# Patient Record
Sex: MALE | Race: WHITE | NOT HISPANIC OR LATINO | Employment: FULL TIME | URBAN - METROPOLITAN AREA
[De-identification: names, ages, dates, MRNs, and addresses within clinical notes are randomized per-mention and may not be internally consistent; named-entity substitution may affect disease eponyms.]

---

## 2019-08-29 ENCOUNTER — EVALUATION (OUTPATIENT)
Dept: PHYSICAL THERAPY | Age: 29
End: 2019-08-29
Payer: COMMERCIAL

## 2019-08-29 DIAGNOSIS — H81.13 BENIGN PAROXYSMAL VERTIGO OF BOTH EARS: Primary | ICD-10-CM

## 2019-08-29 DIAGNOSIS — R42 DIZZINESS: ICD-10-CM

## 2019-08-29 PROCEDURE — 97161 PT EVAL LOW COMPLEX 20 MIN: CPT | Performed by: PHYSICAL THERAPIST

## 2019-08-29 PROCEDURE — 95992 CANALITH REPOSITIONING PROC: CPT | Performed by: PHYSICAL THERAPIST

## 2019-08-29 PROCEDURE — 97110 THERAPEUTIC EXERCISES: CPT | Performed by: PHYSICAL THERAPIST

## 2019-08-29 NOTE — PROGRESS NOTES
PT Evaluation     Today's date: 2019  Patient name: Carolynn Davis  : 1990  MRN: [id number]  Referring provider: Anabella Guillen DO  Dx:   Encounter Diagnosis     ICD-10-CM    1  Benign paroxysmal vertigo of both ears H81 13    2  Dizziness R42        Start Time: 830  Stop Time: 930  Total time in clinic (min): 60 minutes    Assessment  Assessment details: Carolynn Davis is a 34 y o  male who presents with dizziness  Due to these impairments, Patient has difficulty performing recreational activities, work-related activities and engaging in social activities  Patient's clinical presentation is consistent with their referring diagnosis of dizziness  Patient was negative with the dik hallpike to both sides, positive with anterior maneuver, overall head positions  Patient given HEP  Patient going on vacation Saturday for a week  Plan to follow up with patient upon his return  Patient would benefit from skilled physical therapy to address their aforementioned impairments, improve their level of function and to improve their overall quality of life  Impairments: activity intolerance, lacks appropriate home exercise program and poor posture   Other impairment: dizziness  Barriers to therapy: History of allergies  Understanding of Dx/Px/POC: excellent  Goals    Goals  STG 1-2 weeks  1  Decrease c/o dizziness/vertigo by 90%  2  transfers with ease from supine-sit- stand without symptoms of dizziness  LTG 2-4 weeks  1  Resolve symptoms of vertigo  2  Independent HEP for home Epley maneuver/self management  3  DHI<10%  4  Pt able to move in/out of bed and roll over without onset of symptoms  5  Able to return to prior level of function without medication for dizziness  Goals  LT-6 WEEKS  1  Patient to be independent with a/iadls  2  Increase functional activities for leisure and home activities to previous LOF    3  Independent with HEP and/or fitness program     Plan  Patient would benefit from: skilled physical therapy  Planned therapy interventions: body mechanics training, home exercise program, neuromuscular re-education, patient education, postural training, stretching, therapeutic activities, therapeutic exercise, canalith repositioning and activity modification  Frequency: 2-3x week  Duration in weeks: 8  Plan of Care beginning date: 8/29/2019  Plan of Care expiration date: 10/29/2019  Treatment plan discussed with: patient        Subjective Evaluation    History of Present Illness  Mechanism of injury: Patient reports he had a couple ear infections over the past month and was on drops and prednisone  Patient was seen at ER, ENT, ordered MRI of brain and normal  Patient still has feeling of dizziness at times, especially when walking  Patient was able to return to work  Not a recurrent problem   Pain  No pain reported    Social Support  Lives with: grandma  Employment status: working    Diagnostic Tests  MRI studies: normal    FCE comments: Brain MRI: negative/ptPatient Goals  Patient goal: not be dizzy  Objective     Concurrent Complaints  Negative for headaches, nausea/motion sickness, tinnitus, visual change, hearing loss, memory loss, aural fullness, poor concentration and peripheral neuropathy    Static Posture     Head  Forward  Shoulders  Rounded      Postural Observations  Seated posture: poor  Standing posture: fair  Correction of posture: has no consistent effect        Neurological Testing     Sensation   Cervical/Thoracic   Left   Intact: light touch    Right   Intact: light touch    Active Range of Motion   Cervical/Thoracic Spine     Normal active range of motion    Passive Range of Motion   Cervical/Thoracic Spine   Normal passive range of motion    Strength/Myotome Testing   Cervical Spine     Left   Normal strength    Right   Normal strength    General Comments:    Upper quarter screen   Shoulder: unremarkable  Neuro Exam:     Dizziness  Positive for disequilibrium and floating or swimming  Exacerbating factors  Positive for rolling in bed, looking up, walking, turning head, supine to/from sitting and walking in busy environment  Bending over: sometimes       Symptoms   Duration: <5sec  Frequency: daily    Headaches   Patient reports headaches: No      Oculomotor exam   Oculomotor ROM: WNL  Resting nystagmus: not present   Gaze holding nystagmus: not present left  and not present right  Smooth pursuits: within normal limits  Vertical saccades: normal  Horizontal saccades: normal    Positional testing   [place]pike   Left posterior canal: WNL  Right posterior canal: WNL    Functional outcomes   Functional outcome comment: DHI: 24/100      Flowsheet Rows      Most Recent Value   PT/OT G-Codes   Current Score  73   Projected Score  95             Precautions: standard      Manual  8/29            Dik Hallpike L 1x            Dik Hallpike R 1x            Anterior maneurver 1x (3min)                                          Exercise Diary  8/29            HEP 10'

## 2019-09-17 NOTE — PROGRESS NOTES
Patient called to state he is feeling good, able to work and has no symptoms anymore  D/c PT at this time

## 2020-01-21 RX ORDER — SUCRALFATE 1 G/1
1 TABLET ORAL 4 TIMES DAILY
COMMUNITY
Start: 2020-01-20 | End: 2020-02-17

## 2020-01-22 ENCOUNTER — OFFICE VISIT (OUTPATIENT)
Dept: GASTROENTEROLOGY | Facility: CLINIC | Age: 30
End: 2020-01-22
Payer: COMMERCIAL

## 2020-01-22 ENCOUNTER — PREP FOR PROCEDURE (OUTPATIENT)
Dept: GASTROENTEROLOGY | Facility: CLINIC | Age: 30
End: 2020-01-22

## 2020-01-22 VITALS
SYSTOLIC BLOOD PRESSURE: 126 MMHG | BODY MASS INDEX: 35.53 KG/M2 | HEART RATE: 99 BPM | WEIGHT: 253.8 LBS | HEIGHT: 71 IN | DIASTOLIC BLOOD PRESSURE: 90 MMHG

## 2020-01-22 DIAGNOSIS — R10.13 EPIGASTRIC PAIN: Primary | ICD-10-CM

## 2020-01-22 PROCEDURE — 99203 OFFICE O/P NEW LOW 30 MIN: CPT | Performed by: PHYSICIAN ASSISTANT

## 2020-01-22 RX ORDER — ESOMEPRAZOLE MAGNESIUM 40 MG/1
CAPSULE, DELAYED RELEASE ORAL
COMMUNITY
Start: 2020-01-06

## 2020-01-22 NOTE — PROGRESS NOTES
[facility] - Outpatient Consultation  Meryle Catalina 34 y o  male MRN: [id number]  Encounter: [id number]          ASSESSMENT AND PLAN:      1  Epigastric pain  6 weeks  Not improving with Prilosec or Nexium  Pantoprazole caused a headache    Will plan US and EGD    ______________________________________________________________________    HPI:  34year old male presents for evaluation of epigastric pain  He reports this been ongoing for approximately 5-6 weeks  He describes it as a pressure sensation  It tends to be the worst in the morning and get better as the day goes on  He has had a few episodes of heartburn but otherwise has no other symptoms of acid reflux  He initially started taking over-the-counter Prilosec without improvement  He then saw his family doctor complete him on pantoprazole which caused headache  He was eventually switched to Nexium which she thought was helping for the 1st few weeks but recently has not helped at all  He states that eating occasionally makes his symptoms worse but for the most part is able to eat without issues  There is no nausea, vomiting, trouble swallowing, hematemesis or melena  His weight is stable  Prior to symptom onset he denies any stressful event, new medications, travel  There is no change in the bowel movements  REVIEW OF SYSTEMS:    CONSTITUTIONAL: Denies any fever, chills, rigors, and weight loss  HEENT: No earache or tinnitus  Denies hearing loss or visual disturbances  CARDIOVASCULAR: No chest pain or palpitations  RESPIRATORY: Denies any cough, hemoptysis, shortness of breath or dyspnea on exertion  GASTROINTESTINAL: As noted in the History of Present Illness  GENITOURINARY: No problems with urination  Denies any hematuria or dysuria  NEUROLOGIC: No dizziness or vertigo, denies headaches  MUSCULOSKELETAL: Denies any muscle or joint pain  SKIN: Denies skin rashes or itching     ENDOCRINE: Denies excessive thirst  Denies intolerance to heat or cold  PSYCHOSOCIAL: Denies depression or anxiety  Denies any recent memory loss  Historical Information   Past Medical History:   Diagnosis Date    Allergic rhinitis      Past Surgical History:   Procedure Laterality Date    ADENOIDECTOMY      TONSILLECTOMY       Social History   Social History     Substance and Sexual Activity   Alcohol Use Not Currently    Frequency: Monthly or less     Social History     Substance and Sexual Activity   Drug Use Never     Social History     Tobacco Use   Smoking Status Never Smoker   Smokeless Tobacco Never Used     Family History   Problem Relation Age of Onset    No Known Problems Mother     No Known Problems Father        Meds/Allergies       Current Outpatient Medications:     esomeprazole (NexIUM) 40 MG capsule    sucralfate (CARAFATE) 1 g tablet    fluticasone (FLONASE) 50 mcg/act nasal spray    No Known Allergies        Objective     Blood pressure 126/90, pulse 99, height 5' 11" (1 803 m), weight 115 kg (253 lb 12 8 oz)  Body mass index is 35 4 kg/m²  PHYSICAL EXAM:      General Appearance:   Alert, cooperative, no distress   HEENT:   Normocephalic, atraumatic, anicteric      Neck:  Supple, symmetrical, trachea midline   Lungs:   Clear to auscultation bilaterally; no rales, rhonchi or wheezing; respirations unlabored    Heart[de-identified]   Regular rate and rhythm; no murmur, rub, or gallop  Abdomen:   Soft, non-tender, non-distended; normal bowel sounds; no masses, no organomegaly    Genitalia:   Deferred    Rectal:   Deferred    Extremities:  No cyanosis, clubbing or edema    Pulses:  2+ and symmetric    Skin:  No jaundice, rashes, or lesions    Lymph nodes:  No palpable cervical lymphadenopathy        Lab Results:   No visits with results within 1 Day(s) from this visit  Latest known visit with results is:   No results found for any previous visit  Radiology Results:   No results found

## 2020-01-23 ENCOUNTER — HOSPITAL ENCOUNTER (OUTPATIENT)
Dept: ULTRASOUND IMAGING | Facility: CLINIC | Age: 30
Discharge: HOME | End: 2020-01-23
Payer: COMMERCIAL

## 2020-01-23 ENCOUNTER — ANESTHESIA EVENT (OUTPATIENT)
Dept: GASTROENTEROLOGY | Facility: HOSPITAL | Age: 30
End: 2020-01-23

## 2020-01-23 DIAGNOSIS — R10.13 EPIGASTRIC PAIN: ICD-10-CM

## 2020-01-23 PROCEDURE — 76700 US EXAM ABDOM COMPLETE: CPT

## 2020-01-23 RX ORDER — SODIUM CHLORIDE, SODIUM LACTATE, POTASSIUM CHLORIDE, CALCIUM CHLORIDE 600; 310; 30; 20 MG/100ML; MG/100ML; MG/100ML; MG/100ML
125 INJECTION, SOLUTION INTRAVENOUS CONTINUOUS
Status: CANCELLED | OUTPATIENT
Start: 2020-01-23

## 2020-01-24 ENCOUNTER — HOSPITAL ENCOUNTER (OUTPATIENT)
Dept: GASTROENTEROLOGY | Facility: HOSPITAL | Age: 30
Setting detail: OUTPATIENT SURGERY
Discharge: HOME | End: 2020-01-24
Attending: INTERNAL MEDICINE | Admitting: INTERNAL MEDICINE
Payer: COMMERCIAL

## 2020-01-24 ENCOUNTER — ANESTHESIA (OUTPATIENT)
Dept: GASTROENTEROLOGY | Facility: HOSPITAL | Age: 30
End: 2020-01-24

## 2020-01-24 VITALS
TEMPERATURE: 97.8 F | RESPIRATION RATE: 18 BRPM | HEIGHT: 69 IN | HEART RATE: 79 BPM | WEIGHT: 253.53 LBS | SYSTOLIC BLOOD PRESSURE: 126 MMHG | OXYGEN SATURATION: 99 % | BODY MASS INDEX: 37.55 KG/M2 | DIASTOLIC BLOOD PRESSURE: 73 MMHG

## 2020-01-24 DIAGNOSIS — R10.13 EPIGASTRIC PAIN: ICD-10-CM

## 2020-01-24 PROCEDURE — 43239 EGD BIOPSY SINGLE/MULTIPLE: CPT | Performed by: INTERNAL MEDICINE

## 2020-01-24 PROCEDURE — 88305 TISSUE EXAM BY PATHOLOGIST: CPT | Performed by: PATHOLOGY

## 2020-01-24 RX ORDER — PROPOFOL 10 MG/ML
INJECTION, EMULSION INTRAVENOUS AS NEEDED
Status: DISCONTINUED | OUTPATIENT
Start: 2020-01-24 | End: 2020-01-24 | Stop reason: SURG

## 2020-01-24 RX ORDER — LIDOCAINE HYDROCHLORIDE 10 MG/ML
INJECTION, SOLUTION EPIDURAL; INFILTRATION; INTRACAUDAL; PERINEURAL AS NEEDED
Status: DISCONTINUED | OUTPATIENT
Start: 2020-01-24 | End: 2020-01-24 | Stop reason: SURG

## 2020-01-24 RX ORDER — SODIUM CHLORIDE, SODIUM LACTATE, POTASSIUM CHLORIDE, CALCIUM CHLORIDE 600; 310; 30; 20 MG/100ML; MG/100ML; MG/100ML; MG/100ML
INJECTION, SOLUTION INTRAVENOUS CONTINUOUS PRN
Status: DISCONTINUED | OUTPATIENT
Start: 2020-01-24 | End: 2020-01-24 | Stop reason: SURG

## 2020-01-24 RX ADMIN — PROPOFOL 20 MG: 10 INJECTION, EMULSION INTRAVENOUS at 12:25

## 2020-01-24 RX ADMIN — PROPOFOL 50 MG: 10 INJECTION, EMULSION INTRAVENOUS at 12:24

## 2020-01-24 RX ADMIN — SODIUM CHLORIDE, SODIUM LACTATE, POTASSIUM CHLORIDE, AND CALCIUM CHLORIDE: .6; .31; .03; .02 INJECTION, SOLUTION INTRAVENOUS at 12:18

## 2020-01-24 RX ADMIN — PROPOFOL 100 MG: 10 INJECTION, EMULSION INTRAVENOUS at 12:23

## 2020-01-24 RX ADMIN — LIDOCAINE HYDROCHLORIDE 50 MG: 10 INJECTION, SOLUTION EPIDURAL; INFILTRATION; INTRACAUDAL; PERINEURAL at 12:23

## 2020-01-24 NOTE — H&P
History and Physical -  [facility]  Myesha Forrester 34 y o  male MRN: [id number]      HPI: Myseha Forrester is a 34y o  year old male who presents for the evaluation of epigastric abdominal pain      REVIEW OF SYSTEMS: Per the HPI, and otherwise unremarkable  Historical Information   Past Medical History:   Diagnosis Date    Allergic rhinitis      Past Surgical History:   Procedure Laterality Date    ADENOIDECTOMY      TONSILLECTOMY       Social History   Social History     Substance and Sexual Activity   Alcohol Use Not Currently     Social History     Substance and Sexual Activity   Drug Use Never     Social History     Tobacco Use   Smoking Status Never Smoker   Smokeless Tobacco Never Used     Family History   Problem Relation Age of Onset    No Known Problems Mother     No Known Problems Father        Meds/Allergies       (Not in a hospital admission)    No Known Allergies    Objective     Blood pressure 133/73, pulse 88, temperature 98 °F (36 7 °C), temperature source Oral, resp  rate 16, height 5' 9" (1 753 m), weight 115 kg (253 lb 8 5 oz), SpO2 98 %  PHYSICAL EXAM    Gen: NAD  CV: RRR  CHEST: Clear  ABD: soft, NT/ND  EXT: no edema      ASSESSMENT/PLAN:  This is a 34y o  year old male here for esophagogastroduodenoscopy with biopsies, and he is stable and optimized for his procedure

## 2020-01-24 NOTE — DISCHARGE INSTRUCTIONS
Upper Endoscopy   WHAT YOU NEED TO KNOW:   An upper endoscopy is also called an upper gastrointestinal (GI) endoscopy, or an esophagogastroduodenoscopy (EGD)  You may feel bloated, gassy, or have some abdominal discomfort after your procedure  Your throat may be sore for 24 to 36 hours  You may burp or pass gas from air that is still inside your body  DISCHARGE INSTRUCTIONS:   Call 911 if:   · You have sudden chest pain or trouble breathing  Seek care immediately if:   · You feel dizzy or faint  · You have trouble swallowing  · You have severe throat pain  · Your bowel movements are very dark or black  · Your abdomen is hard and firm and you have severe pain  · You vomit blood  Contact your healthcare provider if:   · You feel full or bloated and cannot burp or pass gas  · You have not had a bowel movement for 3 days after your procedure  · You have neck pain  · You have a fever or chills  · You have nausea or are vomiting  · You have a rash or hives  · You have questions or concerns about your endoscopy  Relieve a sore throat:  Suck on throat lozenges or crushed ice  Gargle with a small amount of warm salt water  Mix 1 teaspoon of salt and 1 cup of warm water to make salt water  Relieve gas and discomfort from bloating:  Lie on your right side with a heating pad on your abdomen  Take short walks to help pass gas  Eat small meals until bloating is relieved  Rest after your procedure:  Do not drive or make important decisions until the day after your procedure  Return to your normal activity as directed  You can usually return to work the day after your procedure  Follow up with your healthcare provider as directed:  Write down your questions so you remember to ask them during your visits  © 2017 [employer]  Information is for End User's use only and may not be sold, redistributed or otherwise used for commercial purposes   All illustrations and images included in [employer] are the copyrighted property of [employer] , [employer]  or [employer] [place]  The above information is an  only  It is not intended as medical advice for individual conditions or treatments  Talk to your doctor, nurse or pharmacist before following any medical regimen to see if it is safe and effective for you

## 2020-01-24 NOTE — ANESTHESIA POSTPROCEDURE EVALUATION
Post-Op Assessment Note    CV Status:  Stable  Pain Score: 0    Pain management: adequate     Mental Status:  Awake   Hydration Status:  Stable   PONV Controlled:  None   Airway Patency:  Patent and adequate   Post Op Vitals Reviewed: Yes      Staff: CRNA           /72 (01/24/20 1231)    Temp 97 8 °F (36 6 °C) (01/24/20 1231)    Pulse 95 (01/24/20 1231)   Resp 16 (01/24/20 1231)    SpO2 94 % (01/24/20 1231)

## 2020-01-24 NOTE — ANESTHESIA PREPROCEDURE EVALUATION
Review of Systems/Medical History  Patient summary reviewed    No history of anesthetic complications     Cardiovascular  Exercise tolerance (METS): >4,     Pulmonary  Negative pulmonary ROS        GI/Hepatic       Negative  ROS        Endo/Other    Obesity    GYN       Hematology  Negative hematology ROS      Musculoskeletal  Negative musculoskeletal ROS        Neurology  Negative neurology ROS      Psychology   Negative psychology ROS              Physical Exam    Airway    Mallampati score: I  TM Distance: >3 FB  Neck ROM: full     Dental   No notable dental hx     Cardiovascular  Rhythm: regular, Rate: normal,     Pulmonary  Breath sounds clear to auscultation,     Other Findings        Anesthesia Plan  ASA Score- 2     Anesthesia Type- IV sedation with anesthesia with ASA Monitors  Additional Monitors:   Airway Plan:         Plan Factors-  Patient did not smoke on day of surgery  Induction- intravenous  Postoperative Plan-     Informed Consent- Anesthetic plan and risks discussed with patient  I personally reviewed this patient with the CRNA  Discussed and agreed on the Anesthesia Plan with the CRNA  Malissa [facility]

## 2020-01-27 ENCOUNTER — TELEPHONE (OUTPATIENT)
Dept: GASTROENTEROLOGY | Facility: CLINIC | Age: 30
End: 2020-01-27

## 2020-01-27 NOTE — TELEPHONE ENCOUNTER
----- Message from Kong Fortune PA-C sent at 1/27/2020  9:21 AM EST -----  Please let him know this was normal

## 2020-01-31 ENCOUNTER — TELEPHONE (OUTPATIENT)
Dept: GASTROENTEROLOGY | Facility: CLINIC | Age: 30
End: 2020-01-31

## 2020-02-04 ENCOUNTER — TREATMENT (OUTPATIENT)
Dept: GASTROENTEROLOGY | Facility: CLINIC | Age: 30
End: 2020-02-04

## 2020-02-04 DIAGNOSIS — R10.13 EPIGASTRIC PAIN: Primary | ICD-10-CM

## 2020-02-11 ENCOUNTER — HOSPITAL ENCOUNTER (OUTPATIENT)
Dept: RADIOLOGY | Facility: HOSPITAL | Age: 30
Discharge: HOME | End: 2020-02-11
Payer: COMMERCIAL

## 2020-02-11 DIAGNOSIS — R10.13 EPIGASTRIC PAIN: ICD-10-CM

## 2020-02-11 PROCEDURE — 78227 HEPATOBIL SYST IMAGE W/DRUG: CPT

## 2020-02-11 PROCEDURE — A9537 TC99M MEBROFENIN: HCPCS

## 2020-02-12 ENCOUNTER — TELEPHONE (OUTPATIENT)
Dept: GASTROENTEROLOGY | Facility: CLINIC | Age: 30
End: 2020-02-12

## 2020-02-12 DIAGNOSIS — R10.13 EPIGASTRIC PAIN: Primary | ICD-10-CM

## 2020-02-12 RX ORDER — METOCLOPRAMIDE 5 MG/1
5 TABLET ORAL 3 TIMES DAILY
Qty: 60 TABLET | Refills: 0 | Status: SHIPPED | OUTPATIENT
Start: 2020-02-12 | End: 2020-02-17

## 2020-02-12 NOTE — TELEPHONE ENCOUNTER
----- Message from [employer] sent at 2/11/2020  4:58 PM EST -----  The hepatobiliary scan was normal   Please call this results to the patient

## 2020-02-12 NOTE — TELEPHONE ENCOUNTER
Evangelina Marques pt - Pt has a question about a test that was done  All of them have come back negative  Pt has had a cough this whole time and he wanted to know if it was related to his GERD/gastritis  Please call [phone]   Ty

## 2020-02-12 NOTE — TELEPHONE ENCOUNTER
Spoke to patient  Minimally better on PPI BID  NO relief with other PPIs or carafate  Will trial regaln 5mg TID with the BID PPI

## 2020-02-13 ENCOUNTER — TELEPHONE (OUTPATIENT)
Dept: GASTROENTEROLOGY | Facility: CLINIC | Age: 30
End: 2020-02-13

## 2020-02-13 NOTE — TELEPHONE ENCOUNTER
Unfortunately, there is no alternative to metoclopramide  I am putting him a very low dose  The risk of tardive dyskinesia is increased the higher the dose and the longer someone is on the medication  The risk is the greatest in elderly females

## 2020-02-13 NOTE — TELEPHONE ENCOUNTER
snehal pt        pt called , he is very apprehensive in starting Reglan after reading the side effects ,  He would like to know if there may be an alternative to consider

## 2020-02-13 NOTE — TELEPHONE ENCOUNTER
Called pt and relayed message  Pt said he will discuss it further with Pan Marc at his office appt and will see how he does up until then

## 2020-02-17 RX ORDER — SUCRALFATE 1 G/1
1 TABLET ORAL 4 TIMES DAILY
COMMUNITY
Start: 2020-01-20

## 2020-02-20 ENCOUNTER — OFFICE VISIT (OUTPATIENT)
Dept: GASTROENTEROLOGY | Facility: CLINIC | Age: 30
End: 2020-02-20
Payer: COMMERCIAL

## 2020-02-20 VITALS
SYSTOLIC BLOOD PRESSURE: 124 MMHG | HEIGHT: 69 IN | WEIGHT: 251 LBS | HEART RATE: 98 BPM | BODY MASS INDEX: 37.18 KG/M2 | DIASTOLIC BLOOD PRESSURE: 78 MMHG

## 2020-02-20 DIAGNOSIS — R10.13 EPIGASTRIC PAIN: Primary | ICD-10-CM

## 2020-02-20 PROCEDURE — 99213 OFFICE O/P EST LOW 20 MIN: CPT | Performed by: PHYSICIAN ASSISTANT

## 2020-02-20 RX ORDER — FAMOTIDINE 20 MG/1
TABLET, FILM COATED ORAL
COMMUNITY
Start: 2020-02-15

## 2020-02-20 NOTE — PROGRESS NOTES
[facility] - Outpatient Follow-up Note  Ankit Grewal 34 y o  male MRN: [id number]  Encounter: [id number]          ASSESSMENT AND PLAN:      1  Epigastric pain  Still described as a pressure and worst in the morning  EGD was normal  US and HIDA CCK were normal  He failed Prilosec, Pantoprazole and Nexium 80mg daily  Consider diagnosis of FD  He does not want to use Reglan due to concerns over side effects  Consider use of Amitriptyline 25mg qhs  Consider CT scan    He is pursuing a low FODMAP diet  He will call in 1-2 weeks with an update    ______________________________________________________________________    SUBJECTIVE:  43-year-old male presents for follow-up of epigastric pain  He reports that the symptom has not changed since his last visit  He continues to describe it as a pressure sensation in the upper abdomen  This symptom is always worse when he wakes up in the morning  He has failed treatment with Prilosec and high-dose Nexium  EGD, ultrasound and HIDA with CCK were all normal   He recently stopped his Nexium and started Pepcid 20 mg at bedtime and 20 mg in the morning  Initially he thought it was helping however recently his symptoms have been the same  He saw his family doctor recently who recommended a low FODMAP diet which he is pursuing  He continues to deny bloating, diarrhea, constipation, nausea, heartburn  There is no weight loss  He reports that although at times eating affect the symptom for the most part the pain is unaffected by this  He continues to deny any increased life stressors  REVIEW OF SYSTEMS IS OTHERWISE NEGATIVE        Historical Information   Past Medical History:   Diagnosis Date    Allergic rhinitis      Past Surgical History:   Procedure Laterality Date    ADENOIDECTOMY      TONSILLECTOMY       Social History   Social History     Substance and Sexual Activity   Alcohol Use Not Currently     Social History     Substance and Sexual Activity   Drug Use Never     Social History     Tobacco Use   Smoking Status Never Smoker   Smokeless Tobacco Never Used     Family History   Problem Relation Age of Onset    No Known Problems Mother     No Known Problems Father        Meds/Allergies       Current Outpatient Medications:     famotidine (PEPCID) 20 mg tablet    esomeprazole (NexIUM) 40 MG capsule    sucralfate (CARAFATE) 1 g tablet    No Known Allergies        Objective     Blood pressure 124/78, pulse 98, height 5' 9" (1 753 m), weight 114 kg (251 lb)  Body mass index is 37 07 kg/m²  PHYSICAL EXAM:      General Appearance:   Alert, cooperative, no distress   HEENT:   Normocephalic, atraumatic, anicteric      Neck:  Supple, symmetrical, trachea midline   Lungs:   Clear to auscultation bilaterally; no rales, rhonchi or wheezing; respirations unlabored    Heart[de-identified]   Regular rate and rhythm; no murmur, rub, or gallop  Abdomen:   Soft, non-tender, non-distended; normal bowel sounds; no masses, no organomegaly    Genitalia:   Deferred    Rectal:   Deferred    Extremities:  No cyanosis, clubbing or edema    Pulses:  2+ and symmetric    Skin:  No jaundice, rashes, or lesions    Lymph nodes:  No palpable cervical lymphadenopathy        Lab Results:   No visits with results within 1 Day(s) from this visit     Latest known visit with results is:   Hospital Outpatient Visit on 01/24/2020   Component Date Value    Case Report 01/24/2020                      Value:Surgical Pathology Report                         Case: [id number]                                   Authorizing Provider:  Mukesh Ramirez DO          Collected:           01/24/2020 1226              Ordering Location:      [facility]       Received:            01/24/2020 [facility]                                                             Pathologist:           Meryl Christian MD Specimen:    Stomach, Antrum                                                                            Final Diagnosis 01/24/2020                      Value: This result contains rich text formatting which cannot be displayed here   Additional Information 01/24/2020                      Value: This result contains rich text formatting which cannot be displayed here  Mikhail Bones Gross Description 01/24/2020                      Value: This result contains rich text formatting which cannot be displayed here   Clinical Information 01/24/2020                      Value:Cold Bx  R/0  H pylori         Radiology Results:   Us Abdomen Complete    Result Date: 1/27/2020  Narrative: ABDOMEN ULTRASOUND, COMPLETE INDICATION:   R10 13: Epigastric pain  COMPARISON: None TECHNIQUE:   Real-time ultrasound of the abdomen was performed with a curvilinear transducer with both volumetric sweeps and still imaging techniques  FINDINGS: PANCREAS:  Visualized portions of the pancreas are within normal limits  AORTA AND IVC:  Visualized portions are normal for patient age  LIVER: Size:  Within normal range  The liver measures 15 3 cm in the midclavicular line  Contour:  Surface contour is smooth  Parenchyma:  Echogenicity and echotexture are within normal limits  No evidence of suspicious mass  Limited imaging of the main portal vein shows it to be patent and hepatopetal  BILIARY: No gallbladder findings  No intrahepatic biliary dilatation  CBD measures 4 mm  No choledocholithiasis  KIDNEY: Right kidney measures 12 2 x 4 9 cm  Within normal limits  Left kidney measures 12 2 x 6 6 cm  Within normal limits  SPLEEN: Measures 11 5 x 11 6 x 4 0 cm  Within normal limits  ASCITES:  None  Impression: Normal sonographic appearance of the complete abdomen   Workstation performed: [id number]     Nm Hepatobiliary W Rx    Result Date: 2/11/2020  Narrative: HEPATOBILIARY SCAN WITH CHOLECYSTOKININ ADMINISTRATION INDICATION: R10 13: Epigastric pain COMPARISON:  Ultrasound 1/23/2020 TECHNIQUE:   Following the intravenous administration of 5 3 mCi Tc-99m labeled mebrofenin, dynamic abdominal images were obtained over a 60 minute time period  Images were performed in AP projection  FINDINGS: There is prompt, uniform accumulation with normal clearance of the radiopharmaceutical by the liver  There is normal filling of the intrahepatic ducts, common bile duct and gallbladder with normal excretion of the radiopharmaceutical into the duodenum  In order to evaluate the contractile response of the gallbladder to  cholecystokinin stimulation, 2 3 mcg sincalide (0 02 mcg/kg) was administered by slow intravenous infusion over 60 minutes  These images demonstrate normal contraction of the gallbladder  The calculated gallbladder ejection fraction is 93 % (N = >38%)  The patient experienced no symptoms after CCK administration  Impression: 1  Normal hepatobiliary study   2  Normal contractile response of the gallbladder to cholecystokinin infusion   (93%) Workstation performed: [id number]